# Patient Record
Sex: MALE | Race: WHITE | URBAN - METROPOLITAN AREA
[De-identification: names, ages, dates, MRNs, and addresses within clinical notes are randomized per-mention and may not be internally consistent; named-entity substitution may affect disease eponyms.]

---

## 2018-02-12 ENCOUNTER — OFFICE VISIT (OUTPATIENT)
Dept: PRIMARY CARE CLINIC | Age: 33
End: 2018-02-12
Payer: COMMERCIAL

## 2018-02-12 VITALS
HEIGHT: 73 IN | BODY MASS INDEX: 34.59 KG/M2 | WEIGHT: 261 LBS | SYSTOLIC BLOOD PRESSURE: 120 MMHG | DIASTOLIC BLOOD PRESSURE: 86 MMHG | RESPIRATION RATE: 16 BRPM | TEMPERATURE: 98.1 F | HEART RATE: 76 BPM

## 2018-02-12 DIAGNOSIS — S39.012A BACK STRAIN, INITIAL ENCOUNTER: ICD-10-CM

## 2018-02-12 DIAGNOSIS — M47.16 OSTEOARTHRITIS OF LUMBAR SPINE WITH MYELOPATHY: Primary | ICD-10-CM

## 2018-02-12 DIAGNOSIS — M62.830 BACK SPASM: ICD-10-CM

## 2018-02-12 PROCEDURE — 99203 OFFICE O/P NEW LOW 30 MIN: CPT | Performed by: FAMILY MEDICINE

## 2018-02-12 RX ORDER — CYCLOBENZAPRINE HCL 10 MG
10 TABLET ORAL 3 TIMES DAILY PRN
Qty: 50 TABLET | Refills: 0 | Status: SHIPPED | OUTPATIENT
Start: 2018-02-12 | End: 2018-02-22

## 2018-02-12 RX ORDER — PREDNISONE 10 MG/1
TABLET ORAL
Qty: 30 TABLET | Refills: 0 | Status: SHIPPED | OUTPATIENT
Start: 2018-02-12 | End: 2018-02-26

## 2018-02-12 RX ORDER — DICLOFENAC SODIUM 75 MG/1
75 TABLET, DELAYED RELEASE ORAL 2 TIMES DAILY
Qty: 60 TABLET | Refills: 1 | Status: SHIPPED | OUTPATIENT
Start: 2018-02-12

## 2018-02-12 ASSESSMENT — ENCOUNTER SYMPTOMS
BACK PAIN: 1
BOWEL INCONTINENCE: 0
SHORTNESS OF BREATH: 0
ABDOMINAL PAIN: 0

## 2018-02-12 ASSESSMENT — PATIENT HEALTH QUESTIONNAIRE - PHQ9
SUM OF ALL RESPONSES TO PHQ QUESTIONS 1-9: 1
SUM OF ALL RESPONSES TO PHQ9 QUESTIONS 1 & 2: 1
1. LITTLE INTEREST OR PLEASURE IN DOING THINGS: 0
2. FEELING DOWN, DEPRESSED OR HOPELESS: 1

## 2019-03-08 ENCOUNTER — TELEPHONE (OUTPATIENT)
Dept: PRIMARY CARE CLINIC | Age: 34
End: 2019-03-08

## 2023-05-16 LAB
ALANINE AMINOTRANSFERASE (SGPT) (U/L) IN SER/PLAS: 32 U/L (ref 10–52)
ALBUMIN (G/DL) IN SER/PLAS: 4.8 G/DL (ref 3.4–5)
ALKALINE PHOSPHATASE (U/L) IN SER/PLAS: 55 U/L (ref 33–120)
ANION GAP IN SER/PLAS: 11 MMOL/L (ref 10–20)
ASPARTATE AMINOTRANSFERASE (SGOT) (U/L) IN SER/PLAS: 17 U/L (ref 9–39)
BILIRUBIN TOTAL (MG/DL) IN SER/PLAS: 1 MG/DL (ref 0–1.2)
CALCIUM (MG/DL) IN SER/PLAS: 9.6 MG/DL (ref 8.6–10.3)
CARBON DIOXIDE, TOTAL (MMOL/L) IN SER/PLAS: 25 MMOL/L (ref 21–32)
CHLORIDE (MMOL/L) IN SER/PLAS: 105 MMOL/L (ref 98–107)
CHOLESTEROL (MG/DL) IN SER/PLAS: 160 MG/DL (ref 0–199)
CHOLESTEROL IN HDL (MG/DL) IN SER/PLAS: 32.1 MG/DL
CHOLESTEROL/HDL RATIO: 5
CREATININE (MG/DL) IN SER/PLAS: 1.07 MG/DL (ref 0.5–1.3)
ERYTHROCYTE DISTRIBUTION WIDTH (RATIO) BY AUTOMATED COUNT: 12.4 % (ref 11.5–14.5)
ERYTHROCYTE MEAN CORPUSCULAR HEMOGLOBIN CONCENTRATION (G/DL) BY AUTOMATED: 33 G/DL (ref 32–36)
ERYTHROCYTE MEAN CORPUSCULAR VOLUME (FL) BY AUTOMATED COUNT: 88 FL (ref 80–100)
ERYTHROCYTES (10*6/UL) IN BLOOD BY AUTOMATED COUNT: 5.27 X10E12/L (ref 4.5–5.9)
ESTIMATED AVERAGE GLUCOSE FOR HBA1C: 103 MG/DL
GFR MALE: >90 ML/MIN/1.73M2
GLUCOSE (MG/DL) IN SER/PLAS: 96 MG/DL (ref 74–99)
HEMATOCRIT (%) IN BLOOD BY AUTOMATED COUNT: 46.3 % (ref 41–52)
HEMOGLOBIN (G/DL) IN BLOOD: 15.3 G/DL (ref 13.5–17.5)
HEMOGLOBIN A1C/HEMOGLOBIN TOTAL IN BLOOD: 5.2 %
LDL: 82 MG/DL (ref 0–99)
LEUKOCYTES (10*3/UL) IN BLOOD BY AUTOMATED COUNT: 6 X10E9/L (ref 4.4–11.3)
NON HDL CHOLESTEROL: 128 MG/DL
PLATELETS (10*3/UL) IN BLOOD AUTOMATED COUNT: 266 X10E9/L (ref 150–450)
POTASSIUM (MMOL/L) IN SER/PLAS: 4.4 MMOL/L (ref 3.5–5.3)
PROTEIN TOTAL: 7.4 G/DL (ref 6.4–8.2)
SODIUM (MMOL/L) IN SER/PLAS: 137 MMOL/L (ref 136–145)
THYROTROPIN (MIU/L) IN SER/PLAS BY DETECTION LIMIT <= 0.05 MIU/L: 0.72 MIU/L (ref 0.44–3.98)
THYROXINE (T4) FREE (NG/DL) IN SER/PLAS: 0.84 NG/DL (ref 0.61–1.12)
TRIGLYCERIDE (MG/DL) IN SER/PLAS: 228 MG/DL (ref 0–149)
UREA NITROGEN (MG/DL) IN SER/PLAS: 23 MG/DL (ref 6–23)
VLDL: 46 MG/DL (ref 0–40)

## 2023-10-02 PROBLEM — R42 LIGHTHEADEDNESS: Status: ACTIVE | Noted: 2023-10-02

## 2023-10-02 PROBLEM — R35.0 INCREASED URINARY FREQUENCY: Status: ACTIVE | Noted: 2023-10-02

## 2023-10-02 PROBLEM — R13.10 DYSPHAGIA: Status: ACTIVE | Noted: 2023-10-02

## 2023-10-02 PROBLEM — E66.812 CLASS 2 OBESITY DUE TO EXCESS CALORIES WITH BODY MASS INDEX (BMI) OF 37.0 TO 37.9 IN ADULT: Status: ACTIVE | Noted: 2023-10-02

## 2023-10-02 PROBLEM — R07.89 CHEST PRESSURE: Status: ACTIVE | Noted: 2023-10-02

## 2023-10-02 PROBLEM — B35.9 RINGWORM: Status: ACTIVE | Noted: 2023-10-02

## 2023-10-02 PROBLEM — K21.9 GERD (GASTROESOPHAGEAL REFLUX DISEASE): Status: ACTIVE | Noted: 2023-10-02

## 2023-10-02 PROBLEM — J30.9 ALLERGIC RHINITIS: Status: ACTIVE | Noted: 2023-10-02

## 2023-10-02 PROBLEM — I10 HYPERTENSION: Status: ACTIVE | Noted: 2023-10-02

## 2023-10-02 PROBLEM — F41.9 ANXIETY: Status: ACTIVE | Noted: 2023-10-02

## 2023-10-02 PROBLEM — R07.9 CHEST PAIN: Status: ACTIVE | Noted: 2023-10-02

## 2023-10-02 PROBLEM — F41.0 PANIC ATTACK: Status: ACTIVE | Noted: 2023-10-02

## 2023-10-02 PROBLEM — E66.09 CLASS 2 OBESITY DUE TO EXCESS CALORIES WITH BODY MASS INDEX (BMI) OF 37.0 TO 37.9 IN ADULT: Status: ACTIVE | Noted: 2023-10-02

## 2023-10-02 PROBLEM — R11.0 NAUSEA IN ADULT: Status: ACTIVE | Noted: 2023-10-02

## 2023-10-02 RX ORDER — OMEPRAZOLE 40 MG/1
40 CAPSULE, DELAYED RELEASE ORAL 2 TIMES DAILY
COMMUNITY
End: 2024-03-12

## 2023-10-02 RX ORDER — LISINOPRIL 20 MG/1
20 TABLET ORAL DAILY
COMMUNITY
End: 2024-03-18

## 2023-10-02 RX ORDER — LORATADINE 10 MG/1
10 TABLET ORAL DAILY
COMMUNITY
End: 2023-10-27 | Stop reason: ALTCHOICE

## 2023-10-02 RX ORDER — AMLODIPINE BESYLATE 5 MG/1
5 TABLET ORAL DAILY
COMMUNITY
End: 2024-03-18

## 2023-10-02 RX ORDER — FLUTICASONE PROPIONATE 50 MCG
1 SPRAY, SUSPENSION (ML) NASAL DAILY
COMMUNITY
End: 2023-10-27 | Stop reason: ALTCHOICE

## 2023-10-02 RX ORDER — IBUPROFEN 200 MG
200 TABLET ORAL EVERY 6 HOURS
COMMUNITY

## 2023-10-05 ENCOUNTER — APPOINTMENT (OUTPATIENT)
Dept: PRIMARY CARE | Facility: CLINIC | Age: 38
End: 2023-10-05
Payer: COMMERCIAL

## 2023-10-17 ENCOUNTER — APPOINTMENT (OUTPATIENT)
Dept: RADIOLOGY | Facility: HOSPITAL | Age: 38
End: 2023-10-17
Payer: COMMERCIAL

## 2023-10-17 ENCOUNTER — HOSPITAL ENCOUNTER (EMERGENCY)
Facility: HOSPITAL | Age: 38
Discharge: HOME | End: 2023-10-17
Payer: COMMERCIAL

## 2023-10-17 VITALS
TEMPERATURE: 98.4 F | RESPIRATION RATE: 18 BRPM | HEIGHT: 73 IN | OXYGEN SATURATION: 97 % | HEART RATE: 70 BPM | BODY MASS INDEX: 38.43 KG/M2 | WEIGHT: 290 LBS

## 2023-10-17 DIAGNOSIS — S61.219A FINGER LACERATION, INITIAL ENCOUNTER: Primary | ICD-10-CM

## 2023-10-17 DIAGNOSIS — S62.656B OPEN NONDISPLACED FRACTURE OF MIDDLE PHALANX OF RIGHT LITTLE FINGER, INITIAL ENCOUNTER: ICD-10-CM

## 2023-10-17 PROCEDURE — 2500000004 HC RX 250 GENERAL PHARMACY W/ HCPCS (ALT 636 FOR OP/ED)

## 2023-10-17 PROCEDURE — 96372 THER/PROPH/DIAG INJ SC/IM: CPT

## 2023-10-17 PROCEDURE — 73130 X-RAY EXAM OF HAND: CPT | Mod: RIGHT SIDE | Performed by: RADIOLOGY

## 2023-10-17 PROCEDURE — 12002 RPR S/N/AX/GEN/TRNK2.6-7.5CM: CPT

## 2023-10-17 PROCEDURE — 2500000001 HC RX 250 WO HCPCS SELF ADMINISTERED DRUGS (ALT 637 FOR MEDICARE OP)

## 2023-10-17 PROCEDURE — 73130 X-RAY EXAM OF HAND: CPT | Mod: RT,FY

## 2023-10-17 PROCEDURE — 99284 EMERGENCY DEPT VISIT MOD MDM: CPT

## 2023-10-17 RX ORDER — CEFAZOLIN 1 G/1
1 INJECTION, POWDER, FOR SOLUTION INTRAVENOUS ONCE
Status: COMPLETED | OUTPATIENT
Start: 2023-10-17 | End: 2023-10-17

## 2023-10-17 RX ORDER — HYDROCODONE BITARTRATE AND ACETAMINOPHEN 5; 325 MG/1; MG/1
1 TABLET ORAL ONCE
Status: COMPLETED | OUTPATIENT
Start: 2023-10-17 | End: 2023-10-17

## 2023-10-17 RX ADMIN — CEFAZOLIN 1 G: 1 INJECTION, POWDER, FOR SOLUTION INTRAMUSCULAR; INTRAVENOUS at 14:44

## 2023-10-17 RX ADMIN — HYDROCODONE BITARTRATE AND ACETAMINOPHEN 1 TABLET: 5; 325 TABLET ORAL at 13:58

## 2023-10-17 ASSESSMENT — LIFESTYLE VARIABLES
EVER HAD A DRINK FIRST THING IN THE MORNING TO STEADY YOUR NERVES TO GET RID OF A HANGOVER: NO
EVER FELT BAD OR GUILTY ABOUT YOUR DRINKING: NO
REASON UNABLE TO ASSESS: YES
HAVE PEOPLE ANNOYED YOU BY CRITICIZING YOUR DRINKING: NO
HAVE YOU EVER FELT YOU SHOULD CUT DOWN ON YOUR DRINKING: NO

## 2023-10-17 ASSESSMENT — COLUMBIA-SUICIDE SEVERITY RATING SCALE - C-SSRS
6. HAVE YOU EVER DONE ANYTHING, STARTED TO DO ANYTHING, OR PREPARED TO DO ANYTHING TO END YOUR LIFE?: NO
1. IN THE PAST MONTH, HAVE YOU WISHED YOU WERE DEAD OR WISHED YOU COULD GO TO SLEEP AND NOT WAKE UP?: NO
2. HAVE YOU ACTUALLY HAD ANY THOUGHTS OF KILLING YOURSELF?: NO

## 2023-10-17 ASSESSMENT — PAIN - FUNCTIONAL ASSESSMENT: PAIN_FUNCTIONAL_ASSESSMENT: 0-10

## 2023-10-17 ASSESSMENT — PAIN SCALES - GENERAL: PAINLEVEL_OUTOF10: 9

## 2023-10-17 NOTE — ED PROVIDER NOTES
HPI   Chief Complaint   Patient presents with   • Laceration     Right pinky smashed in tailgate       History provided by: Patient    Limitations to history: None    CC: Laceration    HPI: 38-year-old male presents emergency department to be evaluated for finger laceration that occurred approximately 20 minutes prior to arrival.  Patient was at work where he got his finger pinched in the tailgate of a large truck.  He reports a laceration over the posterior aspect of his fifth finger.  States that the bleeding was mild originally but now controlled on external pressure.  His tetanus is up-to-date and he is not on any anticoagulants.  He states that the area is uncomfortable but he denies numbness, tingling, or weakness.  Denies difficulty moving the finger.  Denies taking anything for his pain prior to arrival.  Denies all other systemic symptoms.    ROS: Negative unless mentioned in HPI    Social Hx: Denies tobacco, alcohol, drug use    Medical Hx: Medical history sent in for hypertension.  Denies allergies.  Immunizations including tetanus are up-to-date.    Surgical HX: Appendectomy                          No data recorded                Patient History   Past Medical History:   Diagnosis Date   • Obesity, unspecified 11/16/2022    Class 2 obesity with body mass index (BMI) of 38.0 to 38.9 in adult   • Obesity, unspecified 11/18/2022    Class 2 obesity with body mass index (BMI) of 37.0 to 37.9 in adult   • Personal history of other diseases of the circulatory system 11/23/2022    History of essential hypertension   • Personal history of other diseases of the digestive system 12/15/2022    History of gastroesophageal reflux (GERD)     Past Surgical History:   Procedure Laterality Date   • CT ANGIO CORONARY ART WITH HEARTFLOW IF SCORE >30%  12/9/2022    CT HEART CORONARY ANGIOGRAM 12/9/2022 [facility]   • OTHER SURGICAL HISTORY  11/16/2022    Hernia repair   • OTHER SURGICAL HISTORY  11/16/2022     Appendectomy   • OTHER SURGICAL HISTORY  11/16/2022    [place] tooth extraction     Family History   Problem Relation Name Age of Onset   • Breast cancer Mother     • Other (malignant neoplasm) Mother     • Other (malignant neoplasm) Father     • Coronary artery disease Other maternal aunt    • Coronary artery disease Other grandparent      Social History     Tobacco Use   • Smoking status: Not on file   • Smokeless tobacco: Not on file   Substance Use Topics   • Alcohol use: Not on file   • Drug use: Not on file       Physical Exam   ED Triage Vitals [10/17/23 1321]   Temp Heart Rate Resp BP   36.9 °C (98.4 °F) 70 18 --      SpO2 Temp src Heart Rate Source Patient Position   97 % -- Monitor --      BP Location FiO2 (%)     -- --       Physical Exam    ED Course & MDM   Diagnoses as of 10/17/23 1608   Finger laceration, initial encounter   Open nondisplaced fracture of middle phalanx of right little finger, initial encounter     Physical exam:    Constitutional: Patient is well-nourished and well-developed.  Appears to be uncomfortable but is nontoxic in appearance.  Oriented to person, place, time, and situation.    HEENT: Head is normocephalic, atraumatic. Patient's airway is patent.  Tympanic membranes are clear bilaterally.  Nasal mucosa clear.  Mouth with normal mucosa.  Throat is not erythematous and there are no oropharyngeal exudates, uvula is midline.  No obvious facial deformities.    Eyes: Clear bilaterally.  Pupils are equal round and reactive to light and accommodation.  Extraocular movements intact.      Cardiac: Regular rate, regular rhythm.  Heart sounds S1, S2.  No murmurs, rubs, or gallops.  PMI nondisplaced.  No JVD.    Respiratory: Regular respiratory rate and effort.  Breath sounds are clear and equal bilaterally, no adventitious lung sounds.  Patient is speaking in full sentences and is in no apparent respiratory distress. No use of accessory muscles.      Gastrointestinal: Abdomen is soft,  nondistended, and nontender.  There are no obvious deformities.  No rebound tenderness or guarding.  Bowel sounds are normal active.    Genitourinary: No CVA or flank tenderness.    Musculoskeletal: No reproducible tenderness.  Patient has an approximate and 3 cm laceration over the posterior proximal fifth finger of the right hand.  Mildly gaping.  No foreign bodies.  No active bleeding.  Patient has equal range of motion in all extremities and no strength deficiencies.  No muscle or joint tenderness. No back or neck tenderness.  Capillary refill less than 3 seconds.  Strong peripheral pulses.  No sensory deficits.  Several other superficial abrasions noted.    Neurological: Patient is alert and oriented.  No focal deficits.  5/5 strength in all extremities.  Cranial nerves II through XII intact. GCS15.     Skin: See MSK exam    Psych: Appropriate mood and affect.  No apparent risk to self or others.    Heme/lymph: No adenopathy, lymphadenopathy, or splenomegaly    Physical exam is otherwise negative unless stated above or in history of present illness.    Medical Decision Making    Patient updated on plan for lab testing, IV insertion, radiology imaging, and medications to be administered while in the ER (if indicated). Patient updated on expected wait times for testing and results. Patient provided my name and told to ask any staff member for questions or concerns if they should arise. Electronic medical record reviewed.     MDM      Patient presented to the emergency department with the chief complaint right finger laceration. No reproducible tenderness.  Patient has an approximate and 3 cm laceration over the posterior proximal fifth finger of the right hand.  Mildly gaping.  No foreign bodies.  No active bleeding.  Patient has equal range of motion in all extremities and no strength deficiencies.  No muscle or joint tenderness. No back or neck tenderness.  Capillary refill less than 3 seconds.  Strong  peripheral pulses.  No sensory deficits. On arrival to the emergency department, vital signs were within normal limits    Patient was given p.o. Norco for his discomfort.  Patient's finger be cleaned by the nursing staff and then we will address his laceration using sutures.  This was done by my student under my direct supervision.  X-ray does confirm a fracture so the patient was given IM Ancef for an open fracture.  Patient tolerated suture insertion well.  He was given wound care supplies here and to go home with.  We did discuss wound care including signs of infection when to get the sutures taken out.  [facility] and his primary care provider.  I discussed NSAIDs needed for discomfort.  I discussed rest, ice, compression, and elevation.  All questions and concerns addressed.  Reasons to return to ER discussed.  Patient verbalized understanding and agreement with the treatment plan and they remained hemodynamically stable in the ER.        This note was dictated using a speech recognition program.  While an attempt was made at proof-reading to minimize errors, minor errors in transcription may be present    Procedure  Laceration Repair    Performed by: Cholo Becerra PA-C  Authorized by: Cholo Becerra PA-C    Consent:     Consent obtained:  Verbal    Consent given by:  Patient    Risks discussed:  Pain  Universal protocol:     Patient identity confirmed:  Verbally with patient and arm band  Anesthesia:     Anesthesia method:  Local infiltration    Local anesthetic:  Lidocaine 1% w/o epi  Laceration details:     Location:  Finger    Finger location:  R small finger    Length (cm):  3  Exploration:     Imaging outcome: foreign body not noted      Wound extent: underlying fracture      Wound extent: no muscle damage noted, no nerve damage noted, no tendon damage noted and no vascular damage noted    Treatment:     Area cleansed with:  Chlorhexidine    Amount of cleaning:   Standard    Visualized foreign bodies/material removed: no      Layers repaired: skin.  Skin repair:     Repair method:  Sutures    Suture size:  4-0    Suture material:  Prolene    Suture technique:  Simple interrupted    Number of sutures:  7  Approximation:     Approximation:  Close  Repair type:     Repair type:  Simple  Post-procedure details:     Dressing:  Non-adherent dressing    Procedure completion:  Tolerated       Cholo Becerra PA-C  10/17/23 1521

## 2023-10-27 ENCOUNTER — OFFICE VISIT (OUTPATIENT)
Dept: PRIMARY CARE | Facility: CLINIC | Age: 38
End: 2023-10-27
Payer: COMMERCIAL

## 2023-10-27 ENCOUNTER — TELEPHONE (OUTPATIENT)
Dept: PRIMARY CARE | Facility: CLINIC | Age: 38
End: 2023-10-27

## 2023-10-27 VITALS
DIASTOLIC BLOOD PRESSURE: 96 MMHG | HEIGHT: 73 IN | OXYGEN SATURATION: 96 % | TEMPERATURE: 98.4 F | WEIGHT: 292.5 LBS | HEART RATE: 76 BPM | BODY MASS INDEX: 38.77 KG/M2 | SYSTOLIC BLOOD PRESSURE: 138 MMHG

## 2023-10-27 DIAGNOSIS — S62.666B OPEN NONDISPLACED FRACTURE OF DISTAL PHALANX OF RIGHT LITTLE FINGER, INITIAL ENCOUNTER: Primary | ICD-10-CM

## 2023-10-27 DIAGNOSIS — S61.216A LACERATION OF RIGHT LITTLE FINGER WITHOUT DAMAGE TO NAIL, FOREIGN BODY PRESENCE UNSPECIFIED, INITIAL ENCOUNTER: Primary | ICD-10-CM

## 2023-10-27 DIAGNOSIS — Z48.02 VISIT FOR SUTURE REMOVAL: ICD-10-CM

## 2023-10-27 PROCEDURE — 3080F DIAST BP >= 90 MM HG: CPT | Performed by: STUDENT IN AN ORGANIZED HEALTH CARE EDUCATION/TRAINING PROGRAM

## 2023-10-27 PROCEDURE — 3075F SYST BP GE 130 - 139MM HG: CPT | Performed by: STUDENT IN AN ORGANIZED HEALTH CARE EDUCATION/TRAINING PROGRAM

## 2023-10-27 PROCEDURE — 99212 OFFICE O/P EST SF 10 MIN: CPT | Performed by: STUDENT IN AN ORGANIZED HEALTH CARE EDUCATION/TRAINING PROGRAM

## 2023-10-27 RX ORDER — MUPIROCIN 20 MG/G
OINTMENT TOPICAL 2 TIMES DAILY
Qty: 22 G | Refills: 0 | Status: SHIPPED | OUTPATIENT
Start: 2023-10-27 | End: 2023-11-06

## 2023-10-27 RX ORDER — MUPIROCIN CALCIUM 20 MG/G
CREAM TOPICAL 2 TIMES DAILY
Qty: 30 G | Refills: 0 | Status: SHIPPED | OUTPATIENT
Start: 2023-10-27 | End: 2023-11-06

## 2023-10-27 ASSESSMENT — PATIENT HEALTH QUESTIONNAIRE - PHQ9
2. FEELING DOWN, DEPRESSED OR HOPELESS: NOT AT ALL
SUM OF ALL RESPONSES TO PHQ9 QUESTIONS 1 AND 2: 0
1. LITTLE INTEREST OR PLEASURE IN DOING THINGS: NOT AT ALL

## 2023-10-27 NOTE — TELEPHONE ENCOUNTER
[facility] called stating that Bactroban cream is not covered, would like it to switch to the Bactroban ointment.

## 2023-10-27 NOTE — PROGRESS NOTES
"Patient ID: Marvin Negron is a 38 y.o. male.    ProceduresSubjective   Patient ID: Marvin Negron is a 38 y.o. male who presents for Suture / Staple Removal (Patient in office today for suture removal from pinky finger. Pt has 7 stitches. ).    REVIEWED ED NOTE  PLACED 7 SUTURES  DOES HAVE FRACTURE  WAS TREATED WITH IV ANCEF   WIFE IS SCHEDULING WITH ORTHO FOR FRACTURE CARE  2 SUTURES FELL OUT NATURALLY TODAY  DOES HAVE SOME MILD ERYTHEMA AND SWELLING  NO OTHER SIGNS OF INFECTION    PLAN  REMOVE REMAINING 5 SUTURES TODAY  FOLLOW-UP WITH ORTHO FOR FRACTURE CARE  RETURN TO OFFICE PRN    Suture / Staple Removal         Review of Systems    Objective   BP (!) 138/96 (BP Location: Left arm, Patient Position: Sitting)   Pulse 76   Temp 36.9 °C (98.4 °F) (Temporal)   Ht 1.854 m (6' 1\")   Wt 133 kg (292 lb 8 oz)   SpO2 96%   BMI 38.59 kg/m²     Physical Exam  Constitutional:       General: He is not in acute distress.     Appearance: Normal appearance. He is not ill-appearing or toxic-appearing.   HENT:      Head: Atraumatic.      Mouth/Throat:      Mouth: Mucous membranes are moist.      Pharynx: Oropharynx is clear.   Eyes:      Extraocular Movements: Extraocular movements intact.      Conjunctiva/sclera: Conjunctivae normal.      Pupils: Pupils are equal, round, and reactive to light.   Cardiovascular:      Rate and Rhythm: Normal rate and regular rhythm.      Pulses: Normal pulses.      Heart sounds: Normal heart sounds. No murmur heard.  Pulmonary:      Effort: Pulmonary effort is normal. No respiratory distress.      Breath sounds: Normal breath sounds.   Abdominal:      General: Abdomen is flat.      Palpations: Abdomen is soft.      Tenderness: There is no abdominal tenderness.   Musculoskeletal:      Right hand: Swelling and laceration (WELL-HELAED) present.      Right lower leg: No edema.      Left lower leg: No edema.      Comments: 5 SUTURES PRESENT, NO SIGNS OF INFECTION   Skin:     General: Skin is warm " and dry.      Capillary Refill: Capillary refill takes less than 2 seconds.      Findings: No rash.   Neurological:      General: No focal deficit present.      Mental Status: He is alert.      Cranial Nerves: No cranial nerve deficit.      Gait: Gait normal.   Psychiatric:         Mood and Affect: Mood normal.         Behavior: Behavior normal.         Assessment/Plan   Problem List Items Addressed This Visit    None  Visit Diagnoses         Codes    Laceration of right little finger without damage to nail, foreign body presence unspecified, initial encounter    -  Primary S61.216A    Visit for suture removal     Z48.02

## 2023-10-30 ENCOUNTER — ANCILLARY PROCEDURE (OUTPATIENT)
Dept: RADIOLOGY | Facility: CLINIC | Age: 38
End: 2023-10-30
Payer: COMMERCIAL

## 2023-10-30 ENCOUNTER — OFFICE VISIT (OUTPATIENT)
Dept: ORTHOPEDIC SURGERY | Facility: CLINIC | Age: 38
End: 2023-10-30
Payer: COMMERCIAL

## 2023-10-30 DIAGNOSIS — S62.666B OPEN NONDISPLACED FRACTURE OF DISTAL PHALANX OF RIGHT LITTLE FINGER, INITIAL ENCOUNTER: ICD-10-CM

## 2023-10-30 PROCEDURE — 73140 X-RAY EXAM OF FINGER(S): CPT | Mod: RIGHT SIDE | Performed by: STUDENT IN AN ORGANIZED HEALTH CARE EDUCATION/TRAINING PROGRAM

## 2023-10-30 PROCEDURE — 73140 X-RAY EXAM OF FINGER(S): CPT | Mod: RT,FY

## 2023-10-30 PROCEDURE — 99203 OFFICE O/P NEW LOW 30 MIN: CPT | Performed by: STUDENT IN AN ORGANIZED HEALTH CARE EDUCATION/TRAINING PROGRAM

## 2023-10-30 PROCEDURE — 99213 OFFICE O/P EST LOW 20 MIN: CPT | Mod: 57,25 | Performed by: STUDENT IN AN ORGANIZED HEALTH CARE EDUCATION/TRAINING PROGRAM

## 2023-10-30 PROCEDURE — 26720 TREAT FINGER FRACTURE EACH: CPT | Performed by: STUDENT IN AN ORGANIZED HEALTH CARE EDUCATION/TRAINING PROGRAM

## 2023-10-30 PROCEDURE — G0463 HOSPITAL OUTPT CLINIC VISIT: HCPCS | Mod: 57,25 | Performed by: STUDENT IN AN ORGANIZED HEALTH CARE EDUCATION/TRAINING PROGRAM

## 2023-10-30 ASSESSMENT — PAIN SCALES - GENERAL: PAINLEVEL_OUTOF10: 0 - NO PAIN

## 2023-10-30 ASSESSMENT — PAIN - FUNCTIONAL ASSESSMENT: PAIN_FUNCTIONAL_ASSESSMENT: 0-10

## 2023-10-30 NOTE — PROGRESS NOTES
History of Present Illness:  Presents for evaluation of left small finger middle phalanx fracture.  The patient sustained an acute injury and notes pain and swelling.    The pain is sharp in nature, severe, worse with movement and better with rest.    He sustained a crush injury on 10/17/2023 at work.  Finger got caught in the hitch of a truck.  He was seen at ER and given IV antibiotics along with tetanus update.  Wound was closed.  He was followed up with PCP for suture removal and told to follow-up here for further fracture care.    Review of Systems   GENERAL: Negative for malaise, significant weight loss, fever  MUSCULOSKELETAL: see HPI  NEURO:  Negative    The patient's past medical history, family history, social history, and review of systems were reviewed. History is otherwise negative except as stated in the HPI.    Physical Examination:  The patient appears to be their stated age, is in no apparent distress, and is oriented x3. The patients mood and affect are appropriate. The patients gait is normal. The examination of the limb in question was performed in comparison to the contralateral limb.  On musculoskeletal examination, the patient has full elbow range of motion. In regards to the hand, there is swelling with some deformity. There is ecchymosis of the hand, but the skin is intact. Range of motion and strength are limited secondary to pain. There is tenderness to palpation of the left small finger middle phalanx.  No obvious malalignment with finger flexion.  There is no obvious tenderness to palpation about the scaphoid or the SL interval, or distal radius. Sensation and motor function are intact in the radial, ulnar, and median nerve distribution. The patient has intact flexor and extensor function, but has difficulty making a full fist secondary to pain. The hand itself is warm and well perfused. The contralateral hand and wrist are normal to inspection, range of motion, stability, and  strength.      Imaging:  AP, lateral, and oblique radiographs of the left small finger reveal extra-articular fracture to the middle phalanx with maintained alignment.    Assessment:  Patient with a left middle phalanx fracture    Plan:  I had long discussion with the patient regarding the fracture. I discussed the risks/benefits/expected outcomes of both non-operative and operative management. Based on the current alignment of the fracture, the patients medical history, and the patients preference, we have elected to proceed with immobilization in a splint.. The patient understands the risks associated with fracture care, which include but are not limited to, malunion, nonunion, loss of reduction or alignment, post traumatic arthrosis, avascular necrosis, chronic pain or stiffness, tendon rupture, compression neuropathy, and potential need for future surgery. In order to minimize the risk of fracture displacement, I will follow the fracture closely with repeat radiographs.  I would like him to come out of his splint 3 times a day to work on finger range of motion.  This will hopefully help with long-term mobility.  I answered all of their questions.    Follow up: 4 weeks for motion check    Rosa Hannah MD

## 2023-12-08 ENCOUNTER — APPOINTMENT (OUTPATIENT)
Dept: ORTHOPEDIC SURGERY | Facility: CLINIC | Age: 38
End: 2023-12-08
Payer: COMMERCIAL

## 2024-03-11 DIAGNOSIS — I10 ESSENTIAL (PRIMARY) HYPERTENSION: ICD-10-CM

## 2024-03-11 DIAGNOSIS — K21.9 GASTRO-ESOPHAGEAL REFLUX DISEASE WITHOUT ESOPHAGITIS: ICD-10-CM

## 2024-03-12 RX ORDER — OMEPRAZOLE 40 MG/1
40 CAPSULE, DELAYED RELEASE ORAL 2 TIMES DAILY
Qty: 180 CAPSULE | Refills: 1 | Status: SHIPPED | OUTPATIENT
Start: 2024-03-12

## 2024-03-18 RX ORDER — LISINOPRIL 20 MG/1
20 TABLET ORAL DAILY
Qty: 90 TABLET | Refills: 3 | Status: SHIPPED | OUTPATIENT
Start: 2024-03-18

## 2024-03-18 RX ORDER — AMLODIPINE BESYLATE 5 MG/1
5 TABLET ORAL DAILY
Qty: 90 TABLET | Refills: 3 | Status: SHIPPED | OUTPATIENT
Start: 2024-03-18

## 2025-04-08 ENCOUNTER — TELEPHONE (OUTPATIENT)
Dept: ORTHOPEDIC SURGERY | Facility: CLINIC | Age: 40
End: 2025-04-08